# Patient Record
Sex: FEMALE | Race: WHITE | Employment: FULL TIME | URBAN - METROPOLITAN AREA
[De-identification: names, ages, dates, MRNs, and addresses within clinical notes are randomized per-mention and may not be internally consistent; named-entity substitution may affect disease eponyms.]

---

## 2022-01-28 ENCOUNTER — OFFICE VISIT (OUTPATIENT)
Dept: NEUROSURGERY | Age: 37
End: 2022-01-28
Payer: COMMERCIAL

## 2022-01-28 VITALS
BODY MASS INDEX: 42.95 KG/M2 | HEIGHT: 69 IN | HEART RATE: 99 BPM | WEIGHT: 290 LBS | RESPIRATION RATE: 16 BRPM | OXYGEN SATURATION: 96 % | TEMPERATURE: 99.1 F | SYSTOLIC BLOOD PRESSURE: 146 MMHG | DIASTOLIC BLOOD PRESSURE: 102 MMHG

## 2022-01-28 DIAGNOSIS — M54.2 POSTERIOR NECK PAIN: Primary | ICD-10-CM

## 2022-01-28 DIAGNOSIS — Z98.1 HX OF FUSION OF CERVICAL SPINE: ICD-10-CM

## 2022-01-28 PROCEDURE — 99203 OFFICE O/P NEW LOW 30 MIN: CPT | Performed by: PHYSICIAN ASSISTANT

## 2022-01-28 RX ORDER — OLANZAPINE 2.5 MG/1
2.5 TABLET ORAL NIGHTLY
COMMUNITY
Start: 2022-01-27 | End: 2022-02-03

## 2022-01-28 RX ORDER — CYCLOBENZAPRINE HCL 5 MG
TABLET ORAL
COMMUNITY
Start: 2022-01-04

## 2022-01-28 RX ORDER — PROPRANOLOL HYDROCHLORIDE 160 MG/1
160 CAPSULE, EXTENDED RELEASE ORAL DAILY
COMMUNITY
Start: 2022-01-13

## 2022-01-28 ASSESSMENT — ENCOUNTER SYMPTOMS
SHORTNESS OF BREATH: 0
ABDOMINAL PAIN: 0
BACK PAIN: 0
TROUBLE SWALLOWING: 0
PHOTOPHOBIA: 0

## 2022-01-28 NOTE — LETTER
[address] [place] [address] [address] [address]  Phone: [phone]  Fax: [phone]    Darryl Yang        January 28, 2022     Patient: Yary Bond   YOB: 1985   Date of Visit: 1/28/2022       To Whom It May Concern:    Yary Bond was seen in our Neurosurgical office on 1/28/22. If you have any questions or concerns, please don't hesitate to call.     Sincerely,        Nawaf Vasquez PA-C

## 2022-01-28 NOTE — PROGRESS NOTES
Subjective:      Patient ID: Michelle Moon is a 39 y.o. female. Michelle Moon is a 39year old female who is known to our services. She previously underwent C3 and C4 corpectomy and anterior C2-C5 fusion by Dr. Yomi Fontana on 11/1/16. Pt had stable post operative follow up. She presents to the office today c/o worsening neck pain with radiation into her shoulders for the past 6 months. Describes the pain as intermittent, sharp, and tightening pressure. Denies injury or fall. Admits to associated numbness and tingling in her fingers. She develops severe headache when pain is constant. She takes muscle relaxers with mild relief. Denies recent PT or FROILAN. Denies loss of bowel or bladder, saddle anesthesia, fever, chills, N/V, SOB, or chest pain. Review of Systems   Constitutional: Negative for fever and unexpected weight change. HENT: Negative for trouble swallowing. Eyes: Negative for photophobia and visual disturbance. Respiratory: Negative for shortness of breath. Cardiovascular: Negative for chest pain. Gastrointestinal: Negative for abdominal pain. Endocrine: Negative for heat intolerance. Genitourinary: Negative for flank pain. Musculoskeletal: Positive for myalgias, neck pain and neck stiffness. Negative for back pain and gait problem. Skin: Negative for wound. Neurological: Positive for numbness. Negative for weakness and headaches. Psychiatric/Behavioral: Negative for confusion. Objective:   Physical Exam  Constitutional:       Appearance: Normal appearance. She is well-developed. HENT:      Head: Normocephalic and atraumatic. Eyes:      Extraocular Movements: Extraocular movements intact. Conjunctiva/sclera: Conjunctivae normal.      Pupils: Pupils are equal, round, and reactive to light. Neck:      Comments: Diffuse tenderness to palpation of cervical spine and trapezius muscle  Decreased ROM secondary to pain   Cardiovascular:      Rate and Rhythm: Normal rate. Pulmonary:      Effort: Pulmonary effort is normal.   Abdominal:      General: There is no distension. Musculoskeletal:      Cervical back: Neck supple. Skin:     General: Skin is warm and dry. Neurological:      Mental Status: She is alert. Comments: Alert and oriented x3  CN3-12 intact  Motor strength full  Sensation intact to light touch  No Hoffmans   Psychiatric:         Thought Content: Thought content normal.         Assessment: This is a 39year old female presenting for worsening neck pain with radiation into her shoulders. Hx C3 and C4 corpectomy and anterior C2-C5 fusion.        Plan:      -Obtain cervical CT myelogram to further evaluate fusion  -OARRS report reviewed   -Return to Neurosurgery clinic after completion of imaging to discuss results and further treatment plan  -Call/return sooner if symptoms worsen or new issues arise in the interim           Marla Spaulding PA-C

## 2022-02-01 DIAGNOSIS — M54.12 CERVICAL RADICULOPATHY: Primary | ICD-10-CM

## 2022-02-01 DIAGNOSIS — Z01.812 PRE-PROCEDURE LAB EXAM: ICD-10-CM

## 2022-02-01 PROBLEM — I83.90 VARICOSE VEINS: Status: ACTIVE | Noted: 2022-02-01

## 2022-02-01 PROBLEM — E55.9 VITAMIN D DEFICIENCY: Status: ACTIVE | Noted: 2022-02-01

## 2022-02-01 PROBLEM — D64.9 ANEMIA, UNSPECIFIED: Status: ACTIVE | Noted: 2022-02-01

## 2022-02-01 RX ORDER — NYSTATIN 100000 U/G
CREAM TOPICAL
COMMUNITY
Start: 2022-01-04

## 2022-02-17 DIAGNOSIS — Z01.812 PRE-PROCEDURE LAB EXAM: ICD-10-CM

## 2022-02-17 DIAGNOSIS — M54.12 CERVICAL RADICULOPATHY: ICD-10-CM

## 2022-02-19 LAB — SARS-COV-2, PCR: NOT DETECTED

## 2022-03-03 ENCOUNTER — HOSPITAL ENCOUNTER (OUTPATIENT)
Dept: CT IMAGING | Age: 37
Discharge: HOME | End: 2022-03-05
Payer: COMMERCIAL

## 2022-03-03 ENCOUNTER — HOSPITAL ENCOUNTER (OUTPATIENT)
Dept: GENERAL RADIOLOGY | Age: 37
Discharge: HOME | End: 2022-03-05
Payer: COMMERCIAL

## 2022-03-03 VITALS
SYSTOLIC BLOOD PRESSURE: 150 MMHG | OXYGEN SATURATION: 100 % | HEART RATE: 71 BPM | WEIGHT: 280 LBS | DIASTOLIC BLOOD PRESSURE: 87 MMHG | HEIGHT: 68 IN | RESPIRATION RATE: 18 BRPM | BODY MASS INDEX: 42.44 KG/M2 | TEMPERATURE: 98.1 F

## 2022-03-03 DIAGNOSIS — M54.2 POSTERIOR NECK PAIN: ICD-10-CM

## 2022-03-03 DIAGNOSIS — Z98.1 HX OF FUSION OF CERVICAL SPINE: ICD-10-CM

## 2022-03-03 LAB
HCG(URINE) PREGNANCY TEST: NEGATIVE
INR BLD: 1.1
PLATELET # BLD: 246 E9/L (ref 130–450)
PROTHROMBIN TIME: 11.5 SEC (ref 9.3–12.4)

## 2022-03-03 PROCEDURE — 7100000011 HC PHASE II RECOVERY - ADDTL 15 MIN

## 2022-03-03 PROCEDURE — 72240 MYELOGRAPHY NECK SPINE: CPT

## 2022-03-03 PROCEDURE — 72126 CT NECK SPINE W/DYE: CPT

## 2022-03-03 PROCEDURE — 85610 PROTHROMBIN TIME: CPT

## 2022-03-03 PROCEDURE — 36415 COLL VENOUS BLD VENIPUNCTURE: CPT

## 2022-03-03 PROCEDURE — 81025 URINE PREGNANCY TEST: CPT

## 2022-03-03 PROCEDURE — 7100000010 HC PHASE II RECOVERY - FIRST 15 MIN

## 2022-03-03 PROCEDURE — 85049 AUTOMATED PLATELET COUNT: CPT

## 2022-03-03 PROCEDURE — 6360000004 HC RX CONTRAST MEDICATION: Performed by: NEUROLOGICAL SURGERY

## 2022-03-03 PROCEDURE — 36591 DRAW BLOOD OFF VENOUS DEVICE: CPT

## 2022-03-03 RX ADMIN — IOPAMIDOL 15 ML: 612 INJECTION, SOLUTION INTRATHECAL at 10:03

## 2022-03-03 NOTE — PROCEDURES
1025Patient returned from procedure. Dressing checked, clean, dry, and intact. Patient stable. No s/s of complications noted or reported. Vitals will be checked q 15min, see flow sheets. 1030Patient eating and drinking well with no s/s of complications noted or reported. 1100Patient discharged, site was checked with every set of vitals. Site clean dry and intact. Discharge papers reviewed with patient, questions answered, discharge paper signed. Patient taken to door. Patient in stable condition, no s/s of complications noted or reported.

## 2022-03-03 NOTE — PROCEDURES
0815Patient arrived  to Radiology department for cervical myelogram.     Allergies, home medications, H&P and fasting instructions reviewed with patient. Vital signs taken. IV placed, blood obtained, IV flushed and prn adapter attached. Blood sample sent to lab for ordered tests. Procedural instructions given in fluoro with MD & tech, questions answered, understanding expressed and consent signed.  Patient given fluoroscopy education, no questions at this time

## 2022-03-11 ENCOUNTER — OFFICE VISIT (OUTPATIENT)
Dept: NEUROSURGERY | Age: 37
End: 2022-03-11
Payer: COMMERCIAL

## 2022-03-11 VITALS
WEIGHT: 280 LBS | DIASTOLIC BLOOD PRESSURE: 91 MMHG | HEART RATE: 61 BPM | SYSTOLIC BLOOD PRESSURE: 170 MMHG | BODY MASS INDEX: 42.44 KG/M2 | HEIGHT: 68 IN | OXYGEN SATURATION: 99 % | TEMPERATURE: 98.1 F | RESPIRATION RATE: 20 BRPM

## 2022-03-11 DIAGNOSIS — M54.2 POSTERIOR NECK PAIN: Primary | ICD-10-CM

## 2022-03-11 DIAGNOSIS — Z98.1 HX OF FUSION OF CERVICAL SPINE: ICD-10-CM

## 2022-03-11 DIAGNOSIS — M54.12 CERVICAL RADICULOPATHY: ICD-10-CM

## 2022-03-11 PROCEDURE — 99213 OFFICE O/P EST LOW 20 MIN: CPT | Performed by: PHYSICIAN ASSISTANT

## 2022-03-11 NOTE — PROGRESS NOTES
Office Follow-up     Subjective: Ramy Villa is a 39year old female who is known to our services. She previously underwent C3 and C4 corpectomy and anterior C2-C5 fusion by Dr. Eliud Smith on 11/1/16. Pt had stable post operative follow up. She presented to the office 1/28/22 c/o worsening neck pain with radiation into her shoulders for the past 6 months. Describes the pain as intermittent, sharp, and tightening pressure. Denies injury or fall. Admits to associated numbness and tingling in her fingers. She develops severe headache when pain is constant. She takes muscle relaxers with mild relief. Denies recent PT or FROILAN. Cervical CT myelogram was ordered at that time. Patient presents to the office today to review imaging. States she does not have any numbness, but her neck and shoulder pain has persisted. Denies loss of bowel or bladder, saddle anesthesia, fever, chills, N/V, SOB, or chest pain. Physical Exam:              WDWN, no apparent distress              Non-labored breathing               Vitals Stable              Alert and oriented x3              CN 3-12 intact              PERRL              EOMI              LUNDBERG well              Motor strength symmetric              Sensation to LT intact bilaterally   Diffuse tenderness to palpation of cervical spine and trapezius muscle   Decreased ROM secondary to pain                   Imaging: 3/3/22 cervical myelogram:   Impression   Anterior fixation from C3-C5 without evidence for complication.       Mild multilevel degenerative change with canal stenosis at C2-3.  No   significant foraminal narrowing. Assessment:  This is a 39 y.o.  female presenting for a follow-up to review imaging     Plan:  -No surgical intervention   -Referral given for PT and pain clinic  -OARRS report reviewed   -Follow-up in neurosurgery clinic PRN  -Call or return to neurosurgery office sooner if symptoms worsen or if new issues arise in the interim.     Electronically signed by Gamaliel Austin PA-C on 3/11/2022 at 4:17 PM

## 2022-05-24 ENCOUNTER — TELEPHONE (OUTPATIENT)
Dept: NEUROSURGERY | Age: 37
End: 2022-05-24

## 2022-05-24 NOTE — TELEPHONE ENCOUNTER
Patient says she stopped doing pain mgmt. . She is still doing pt. She is getting severe headaches. She wants to know if she can do anything else. She has a bone spur.   [phone]

## 2022-05-24 NOTE — TELEPHONE ENCOUNTER
She should continue pain management also. PCP may be able to prescribe something for headaches, or we can place referral for Neurology for headache management. Please let patient know, thanks!